# Patient Record
Sex: MALE | Race: WHITE | NOT HISPANIC OR LATINO
[De-identification: names, ages, dates, MRNs, and addresses within clinical notes are randomized per-mention and may not be internally consistent; named-entity substitution may affect disease eponyms.]

---

## 2017-01-10 PROBLEM — Z00.00 ENCOUNTER FOR PREVENTIVE HEALTH EXAMINATION: Status: ACTIVE | Noted: 2017-01-10

## 2017-01-18 ENCOUNTER — APPOINTMENT (OUTPATIENT)
Dept: OTOLARYNGOLOGY | Facility: CLINIC | Age: 62
End: 2017-01-18

## 2017-01-18 VITALS — WEIGHT: 175 LBS | BODY MASS INDEX: 23.19 KG/M2 | HEIGHT: 73 IN

## 2017-01-18 VITALS
SYSTOLIC BLOOD PRESSURE: 130 MMHG | OXYGEN SATURATION: 99 % | HEART RATE: 73 BPM | TEMPERATURE: 98.5 F | DIASTOLIC BLOOD PRESSURE: 79 MMHG

## 2017-01-18 DIAGNOSIS — H92.02 OTALGIA, LEFT EAR: ICD-10-CM

## 2017-01-18 DIAGNOSIS — Z80.9 FAMILY HISTORY OF MALIGNANT NEOPLASM, UNSPECIFIED: ICD-10-CM

## 2017-01-18 DIAGNOSIS — Z78.9 OTHER SPECIFIED HEALTH STATUS: ICD-10-CM

## 2017-01-18 DIAGNOSIS — F15.90 OTHER STIMULANT USE, UNSPECIFIED, UNCOMPLICATED: ICD-10-CM

## 2017-01-18 DIAGNOSIS — H93.19 TINNITUS, UNSPECIFIED EAR: ICD-10-CM

## 2017-01-18 DIAGNOSIS — Z82.2 FAMILY HISTORY OF DEAFNESS AND HEARING LOSS: ICD-10-CM

## 2017-01-19 ENCOUNTER — TRANSCRIPTION ENCOUNTER (OUTPATIENT)
Age: 62
End: 2017-01-19

## 2017-01-19 LAB
ALBUMIN SERPL ELPH-MCNC: 4.7 G/DL
ALP BLD-CCNC: 66 U/L
ALT SERPL-CCNC: 23 U/L
ANION GAP SERPL CALC-SCNC: 15 MMOL/L
AST SERPL-CCNC: 29 U/L
B BURGDOR AB SER-IMP: NEGATIVE
B BURGDOR IGG+IGM SER QL: 0.85 INDEX
BASOPHILS # BLD AUTO: 0.03 K/UL
BASOPHILS NFR BLD AUTO: 0.6 %
BILIRUB SERPL-MCNC: 0.3 MG/DL
BUN SERPL-MCNC: 19 MG/DL
CALCIUM SERPL-MCNC: 9.7 MG/DL
CHLORIDE SERPL-SCNC: 102 MMOL/L
CO2 SERPL-SCNC: 27 MMOL/L
CREAT SERPL-MCNC: 1.06 MG/DL
DSDNA AB SER-ACNC: <12 IU/ML
EOSINOPHIL # BLD AUTO: 0.06 K/UL
EOSINOPHIL NFR BLD AUTO: 1.1 %
ERYTHROCYTE [SEDIMENTATION RATE] IN BLOOD BY WESTERGREN METHOD: 12 MM/HR
GLUCOSE SERPL-MCNC: 92 MG/DL
HCT VFR BLD CALC: 44.1 %
HGB BLD-MCNC: 14.8 G/DL
IMM GRANULOCYTES NFR BLD AUTO: 0.2 %
LYMPHOCYTES # BLD AUTO: 1.01 K/UL
LYMPHOCYTES NFR BLD AUTO: 19.1 %
MAN DIFF?: NORMAL
MCHC RBC-ENTMCNC: 31 PG
MCHC RBC-ENTMCNC: 33.6 GM/DL
MCV RBC AUTO: 92.3 FL
MONOCYTES # BLD AUTO: 0.48 K/UL
MONOCYTES NFR BLD AUTO: 9.1 %
NEUTROPHILS # BLD AUTO: 3.7 K/UL
NEUTROPHILS NFR BLD AUTO: 69.9 %
PLATELET # BLD AUTO: 222 K/UL
POTASSIUM SERPL-SCNC: 4.6 MMOL/L
PROT SERPL-MCNC: 7.5 G/DL
RBC # BLD: 4.78 M/UL
RBC # FLD: 12.7 %
RHEUMATOID FACT SER QL: 7.1 IU/ML
SODIUM SERPL-SCNC: 144 MMOL/L
T PALLIDUM AB SER DONR QL IF: NONREACTIVE
T3 SERPL-MCNC: 104 NG/DL
T4 SERPL-MCNC: 8.1 UG/DL
TSH SERPL-ACNC: 2.36 UIU/ML
WBC # FLD AUTO: 5.29 K/UL

## 2017-02-02 LAB — INNER EAR 68KD AB FLD QL: NEGATIVE

## 2017-02-15 ENCOUNTER — APPOINTMENT (OUTPATIENT)
Dept: OTOLARYNGOLOGY | Facility: CLINIC | Age: 62
End: 2017-02-15

## 2017-02-15 VITALS
OXYGEN SATURATION: 95 % | TEMPERATURE: 97.4 F | HEART RATE: 72 BPM | SYSTOLIC BLOOD PRESSURE: 114 MMHG | DIASTOLIC BLOOD PRESSURE: 74 MMHG

## 2017-02-15 DIAGNOSIS — H93.11 TINNITUS, RIGHT EAR: ICD-10-CM

## 2017-02-15 DIAGNOSIS — H90.41 SENSORINEURAL HEARING LOSS, UNILATERAL, RIGHT EAR, WITH UNRESTRICTED HEARING ON THE CONTRALATERAL SIDE: ICD-10-CM

## 2017-02-15 DIAGNOSIS — H92.02 OTALGIA, LEFT EAR: ICD-10-CM

## 2017-02-16 PROBLEM — H93.11 TINNITUS, RIGHT: Status: ACTIVE | Noted: 2017-01-18

## 2017-02-16 PROBLEM — H90.41 SENSORINEURAL HEARING LOSS OF RIGHT EAR WITH UNRESTRICTED HEARING OF CONTRALATERAL EAR: Status: ACTIVE | Noted: 2017-01-18

## 2017-02-16 PROBLEM — H92.02 OTALGIA, LEFT: Status: ACTIVE | Noted: 2017-01-18

## 2018-07-23 PROBLEM — Z78.9 ALCOHOL USE: Status: ACTIVE | Noted: 2017-01-18

## 2022-08-29 ENCOUNTER — TRANSCRIPTION ENCOUNTER (OUTPATIENT)
Age: 67
End: 2022-08-29

## 2022-08-29 ENCOUNTER — APPOINTMENT (OUTPATIENT)
Dept: OTOLARYNGOLOGY | Facility: CLINIC | Age: 67
End: 2022-08-29

## 2022-08-29 VITALS — BODY MASS INDEX: 23.09 KG/M2 | WEIGHT: 175 LBS

## 2022-08-29 DIAGNOSIS — H93.8X3 OTHER SPECIFIED DISORDERS OF EAR, BILATERAL: ICD-10-CM

## 2022-08-29 DIAGNOSIS — H90.3 SENSORINEURAL HEARING LOSS, BILATERAL: ICD-10-CM

## 2022-08-29 DIAGNOSIS — H61.23 IMPACTED CERUMEN, BILATERAL: ICD-10-CM

## 2022-08-29 PROCEDURE — 92557 COMPREHENSIVE HEARING TEST: CPT

## 2022-08-29 PROCEDURE — 99203 OFFICE O/P NEW LOW 30 MIN: CPT | Mod: 25

## 2022-08-29 PROCEDURE — 92550 TYMPANOMETRY & REFLEX THRESH: CPT

## 2022-08-29 PROCEDURE — G0268 REMOVAL OF IMPACTED WAX MD: CPT

## 2022-08-29 NOTE — PHYSICAL EXAM
[Midline] : trachea located in midline position [Normal] : no rashes [de-identified] : dense cerumen impaction removed AU to visualize TM with curette under otomicroscopy

## 2022-08-29 NOTE — REASON FOR VISIT
[Initial Evaluation] : an initial evaluation for [Hearing Loss] : hearing loss [Tinnitus] : tinnitus [FreeTextEntry2] : right ear tinnitus and b/l loss of hearing

## 2022-08-29 NOTE — HISTORY OF PRESENT ILLNESS
[de-identified] : Patient presents today c/o right ear hearing loss and tinnitus. Patient states left ear has no changes. Patients last Audio test was performed on 1/18/2017. Patient does not wear hearing aids but is considering purchasing because his hearing has worsened.

## 2022-09-17 ENCOUNTER — OUTPATIENT (OUTPATIENT)
Dept: OUTPATIENT SERVICES | Facility: HOSPITAL | Age: 67
LOS: 1 days | Discharge: HOME | End: 2022-09-17

## 2022-09-17 DIAGNOSIS — Z82.49 FAMILY HISTORY OF ISCHEMIC HEART DISEASE AND OTHER DISEASES OF THE CIRCULATORY SYSTEM: ICD-10-CM

## 2022-09-17 PROCEDURE — 75571 CT HRT W/O DYE W/CA TEST: CPT | Mod: 26

## 2022-12-01 ENCOUNTER — APPOINTMENT (OUTPATIENT)
Dept: DERMATOLOGY | Facility: CLINIC | Age: 67
End: 2022-12-01

## 2023-01-20 ENCOUNTER — APPOINTMENT (OUTPATIENT)
Dept: UROLOGY | Facility: CLINIC | Age: 68
End: 2023-01-20
Payer: MEDICARE

## 2023-01-20 VITALS
SYSTOLIC BLOOD PRESSURE: 112 MMHG | DIASTOLIC BLOOD PRESSURE: 72 MMHG | HEART RATE: 75 BPM | BODY MASS INDEX: 23.19 KG/M2 | WEIGHT: 175 LBS | RESPIRATION RATE: 16 BRPM | OXYGEN SATURATION: 98 % | TEMPERATURE: 97.2 F | HEIGHT: 73 IN

## 2023-01-20 DIAGNOSIS — N13.8 BENIGN PROSTATIC HYPERPLASIA WITH LOWER URINARY TRACT SYMPMS: ICD-10-CM

## 2023-01-20 DIAGNOSIS — N40.1 BENIGN PROSTATIC HYPERPLASIA WITH LOWER URINARY TRACT SYMPMS: ICD-10-CM

## 2023-01-20 PROCEDURE — 99203 OFFICE O/P NEW LOW 30 MIN: CPT

## 2023-01-20 NOTE — END OF VISIT
[FreeTextEntry3] : I participated in obtaining history, personally performed physical exam, formulated treatment plan which I discussed with patient and agree with the above transcription by the physicians assistant

## 2023-01-20 NOTE — ASSESSMENT
[FreeTextEntry1] : 1. BPH maintained on finasteride 5mg x many years- stable LUTS\par \par Plan:\par -finasteride 5mg renewed\par -pt will forward us a copy of his PSA when he gets it this year\par -annual visit given acceptable PSA value

## 2023-01-20 NOTE — HISTORY OF PRESENT ILLNESS
[FreeTextEntry1] : 67 y.o male with h/o BPH maintained on fnasteride 5mg x many years given by his pcp- here to establish care- he reports seeing a urologist last 2 years ago\par voids without difficulty\par \par denies  nocturia\par medium stream\par denies hematuria or dysuria\par notices slightly more urgency lately but no urge incontinence \par \par overall satisfied with his urinary pattern\par \par  with children\par works as a contractor\par \par no FH prostate ca\par \par usually has an annual PSA with his pcp Dr. Ramsey and is scheduled to see him next month\par

## 2023-01-20 NOTE — PHYSICAL EXAM
[Anus Abnormality] : the anus and perineum were normal [Rectal Exam - Rectum] : digital rectal exam was normal [No Prostate Nodules] : no prostate nodules [Prostate Size ___ (0-4)] : prostate size [unfilled] (scale: 0-4) [General Appearance - Well Developed] : well developed [General Appearance - Well Nourished] : well nourished [Normal Appearance] : normal appearance [Well Groomed] : well groomed [General Appearance - In No Acute Distress] : no acute distress [Respiration, Rhythm And Depth] : normal respiratory rhythm and effort [Exaggerated Use Of Accessory Muscles For Inspiration] : no accessory muscle use [Normal Station and Gait] : the gait and station were normal for the patient's age [] : no rash [Oriented To Time, Place, And Person] : oriented to person, place, and time [Affect] : the affect was normal [Mood] : the mood was normal [Not Anxious] : not anxious [FreeTextEntry1] : smooth

## 2023-05-18 ENCOUNTER — APPOINTMENT (OUTPATIENT)
Dept: DERMATOLOGY | Facility: CLINIC | Age: 68
End: 2023-05-18

## 2023-05-18 ENCOUNTER — OUTPATIENT (OUTPATIENT)
Dept: OUTPATIENT SERVICES | Facility: HOSPITAL | Age: 68
LOS: 1 days | End: 2023-05-18
Payer: MEDICARE

## 2023-05-18 VITALS
SYSTOLIC BLOOD PRESSURE: 114 MMHG | HEART RATE: 66 BPM | TEMPERATURE: 96.8 F | DIASTOLIC BLOOD PRESSURE: 71 MMHG | BODY MASS INDEX: 23.46 KG/M2 | HEIGHT: 73 IN | OXYGEN SATURATION: 98 % | WEIGHT: 177 LBS

## 2023-05-18 DIAGNOSIS — Z00.00 ENCOUNTER FOR GENERAL ADULT MEDICAL EXAMINATION WITHOUT ABNORMAL FINDINGS: ICD-10-CM

## 2023-05-18 DIAGNOSIS — L82.1 OTHER SEBORRHEIC KERATOSIS: ICD-10-CM

## 2023-05-18 PROCEDURE — 99203 OFFICE O/P NEW LOW 30 MIN: CPT

## 2023-05-18 NOTE — PHYSICAL EXAM
[Alert] : alert [Oriented x 3] : ~L oriented x 3 [Well Nourished] : well nourished [Full Body Skin Exam Performed] : performed [FreeTextEntry3] : Verrucous 4mm papule with regular borders and colors on the R lower back.\par  Sun damage heliosis with multiple hp macules 2-3 mm present in b/l forearms, no dermoscopic concerns.

## 2023-05-18 NOTE — HISTORY OF PRESENT ILLNESS
[de-identified] : 68 yo M here today c/o dark lesion on R lower back x yrs no changes no sx  and dark spots on blt arms x yrs, no h/o skin cancer. Would also like a  full body check up. 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 No rashes. \par \par \par ROS neg.

## 2023-05-18 NOTE — ASSESSMENT
[FreeTextEntry1] : \par \par # Seborrheic keratosis R lower back:\par - Verrucous 4mm papule with regular borders \par - od/ pt reassured benign/ observe\par \par # Chronic sun damage\par - Mostly present in b/l forearm\par - Rec OTC sunblock \par  \par \par RTC 1 yrs

## 2023-05-19 DIAGNOSIS — L57.9 SKIN CHANGES DUE TO CHRONIC EXPOSURE TO NONIONIZING RADIATION, UNSPECIFIED: ICD-10-CM

## 2023-05-19 DIAGNOSIS — L82.1 OTHER SEBORRHEIC KERATOSIS: ICD-10-CM

## 2023-12-14 ENCOUNTER — RX RENEWAL (OUTPATIENT)
Age: 68
End: 2023-12-14

## 2023-12-14 RX ORDER — FINASTERIDE 5 MG/1
5 TABLET, FILM COATED ORAL
Qty: 90 | Refills: 3 | Status: ACTIVE | COMMUNITY
Start: 1900-01-01 | End: 1900-01-01

## 2024-01-23 ENCOUNTER — APPOINTMENT (OUTPATIENT)
Dept: UROLOGY | Facility: CLINIC | Age: 69
End: 2024-01-23
Payer: MEDICARE

## 2024-01-23 DIAGNOSIS — R39.15 URGENCY OF URINATION: ICD-10-CM

## 2024-01-23 DIAGNOSIS — N40.1 BENIGN PROSTATIC HYPERPLASIA WITH LOWER URINARY TRACT SYMPMS: ICD-10-CM

## 2024-01-23 DIAGNOSIS — N13.8 BENIGN PROSTATIC HYPERPLASIA WITH LOWER URINARY TRACT SYMPMS: ICD-10-CM

## 2024-01-23 LAB
BILIRUB UR QL STRIP: NORMAL
COLLECTION METHOD: NORMAL
GLUCOSE UR-MCNC: NORMAL
HCG UR QL: 0.2 EU/DL
HGB UR QL STRIP.AUTO: NORMAL
KETONES UR-MCNC: NORMAL
LEUKOCYTE ESTERASE UR QL STRIP: NORMAL
NITRITE UR QL STRIP: NORMAL
PH UR STRIP: 5.5
PROT UR STRIP-MCNC: NORMAL
SP GR UR STRIP: 1.01

## 2024-01-23 PROCEDURE — 99214 OFFICE O/P EST MOD 30 MIN: CPT

## 2024-01-23 PROCEDURE — 81003 URINALYSIS AUTO W/O SCOPE: CPT | Mod: QW

## 2024-01-23 NOTE — ASSESSMENT
[FreeTextEntry1] : BPH.  Acceptable PSA slope and this was discussed fully with the patient.  New PSA pending.  Provided this is okay then can follow-up 1 year.  Increased urgency since last visit but not bothersome enough to patient to warrant medication.  Discussed behavioral modifications and decreasing caffeine.

## 2024-01-23 NOTE — HISTORY OF PRESENT ILLNESS
[FreeTextEntry1] : 67-year-old with history of BPH maintained on finasteride 5 mg daily for many years.  I reviewed PSAs.  In 2017 it was 0.71, 2022 it was 0.9, in 2023 it was 1.2.  He is having new PSA drawn tomorrow with PCP.  Patient has nocturia x 0, notices since last visit increased urgency but not bothersome enough to request medication.  He does drink significant amount of caffeinated products.  Urinalysis today negative dip

## 2024-12-25 PROBLEM — F10.90 ALCOHOL USE: Status: ACTIVE | Noted: 2017-01-18

## 2025-07-10 ENCOUNTER — APPOINTMENT (OUTPATIENT)
Dept: OTOLARYNGOLOGY | Facility: CLINIC | Age: 70
End: 2025-07-10
Payer: MEDICARE

## 2025-07-10 VITALS — HEIGHT: 73 IN | WEIGHT: 177 LBS | BODY MASS INDEX: 23.46 KG/M2

## 2025-07-10 PROCEDURE — 92557 COMPREHENSIVE HEARING TEST: CPT

## 2025-07-10 PROCEDURE — G0268 REMOVAL OF IMPACTED WAX MD: CPT

## 2025-07-10 PROCEDURE — 99214 OFFICE O/P EST MOD 30 MIN: CPT | Mod: 25

## 2025-07-10 PROCEDURE — 92567 TYMPANOMETRY: CPT
